# Patient Record
Sex: FEMALE | Race: WHITE | NOT HISPANIC OR LATINO | Employment: FULL TIME | URBAN - NONMETROPOLITAN AREA
[De-identification: names, ages, dates, MRNs, and addresses within clinical notes are randomized per-mention and may not be internally consistent; named-entity substitution may affect disease eponyms.]

---

## 2020-06-25 PROCEDURE — U0003 INFECTIOUS AGENT DETECTION BY NUCLEIC ACID (DNA OR RNA); SEVERE ACUTE RESPIRATORY SYNDROME CORONAVIRUS 2 (SARS-COV-2) (CORONAVIRUS DISEASE [COVID-19]), AMPLIFIED PROBE TECHNIQUE, MAKING USE OF HIGH THROUGHPUT TECHNOLOGIES AS DESCRIBED BY CMS-2020-01-R: HCPCS | Performed by: NURSE PRACTITIONER

## 2020-06-27 ENCOUNTER — TELEPHONE (OUTPATIENT)
Dept: URGENT CARE | Facility: CLINIC | Age: 20
End: 2020-06-27

## 2020-06-27 NOTE — TELEPHONE ENCOUNTER
Pt was called with Covid 19 results Neg.  She was advised that false negatives possible and she could still get Covid.  She had work place exposure 6/22/20.  Tested 6/25/20.  States she actually came down with sx that night (6/25/20) of sore throat, nasal congestion, and fatigue.  No fever, cough, or sob.  No change in taste or smell.  I advised:  Quarantine minimum 10 days from symptoms onset, AND until at least 3 days with no fever, AND all symptoms improving.  She needs to recheck if worse at any time or in 7-10 days if not improving.      She requests work note for the 10 days sent to [facility].   She does not have note.

## 2020-06-29 ENCOUNTER — TELEPHONE (OUTPATIENT)
Dept: URGENT CARE | Facility: CLINIC | Age: 20
End: 2020-06-29

## 2020-06-29 NOTE — TELEPHONE ENCOUNTER
The patient called back to see if her quarantine could be shortened she said that the symptoms that she developed on 6/25 are now pretty much resolving. Her work is wanting her back and wondered if she could be cleared to return to work sooner than July 6. I told her that the CDC guidelines that I am aware of follow what we discussed two days ago. Quarantine minimum of 10 days from symptoms onset no fever for three days and symptoms improving are resolved. The earliest that would be a return would be July 5. Patient states she will relay that information to her work.